# Patient Record
Sex: MALE | Race: ASIAN | NOT HISPANIC OR LATINO | URBAN - METROPOLITAN AREA
[De-identification: names, ages, dates, MRNs, and addresses within clinical notes are randomized per-mention and may not be internally consistent; named-entity substitution may affect disease eponyms.]

---

## 2016-12-16 RX ORDER — ALBUTEROL 90 UG/1
0 AEROSOL, METERED ORAL
Qty: 18 | Refills: 0 | COMMUNITY
Start: 2016-12-16

## 2016-12-16 RX ORDER — UMECLIDINIUM 62.5 UG/1
0 AEROSOL, POWDER ORAL
Qty: 30 | Refills: 0 | COMMUNITY
Start: 2016-12-16

## 2017-01-01 ENCOUNTER — INPATIENT (INPATIENT)
Facility: HOSPITAL | Age: 62
LOS: 3 days | Discharge: HOME | DRG: 386 | End: 2017-01-05
Attending: HOSPITALIST | Admitting: STUDENT IN AN ORGANIZED HEALTH CARE EDUCATION/TRAINING PROGRAM
Payer: MEDICAID

## 2017-01-01 VITALS
SYSTOLIC BLOOD PRESSURE: 189 MMHG | TEMPERATURE: 98 F | OXYGEN SATURATION: 97 % | RESPIRATION RATE: 20 BRPM | HEART RATE: 95 BPM | DIASTOLIC BLOOD PRESSURE: 75 MMHG

## 2017-01-01 DIAGNOSIS — Z87.19 PERSONAL HISTORY OF OTHER DISEASES OF THE DIGESTIVE SYSTEM: Chronic | ICD-10-CM

## 2017-01-01 DIAGNOSIS — Z98.890 OTHER SPECIFIED POSTPROCEDURAL STATES: Chronic | ICD-10-CM

## 2017-01-01 LAB
ALBUMIN SERPL ELPH-MCNC: 3.7 G/DL — SIGNIFICANT CHANGE UP (ref 3.3–5)
ALP SERPL-CCNC: 57 U/L — SIGNIFICANT CHANGE UP (ref 40–120)
ALT FLD-CCNC: 17 U/L RC — SIGNIFICANT CHANGE UP (ref 10–45)
ANION GAP SERPL CALC-SCNC: 15 MMOL/L — SIGNIFICANT CHANGE UP (ref 5–17)
APPEARANCE UR: ABNORMAL
APTT BLD: 28.6 SEC — SIGNIFICANT CHANGE UP (ref 27.5–37.4)
AST SERPL-CCNC: 18 U/L — SIGNIFICANT CHANGE UP (ref 10–40)
BASOPHILS # BLD AUTO: 0.1 K/UL — SIGNIFICANT CHANGE UP (ref 0–0.2)
BASOPHILS NFR BLD AUTO: 0.9 % — SIGNIFICANT CHANGE UP (ref 0–2)
BILIRUB SERPL-MCNC: 0.6 MG/DL — SIGNIFICANT CHANGE UP (ref 0.2–1.2)
BILIRUB UR-MCNC: NEGATIVE — SIGNIFICANT CHANGE UP
BUN SERPL-MCNC: 28 MG/DL — HIGH (ref 7–23)
CALCIUM SERPL-MCNC: 8.9 MG/DL — SIGNIFICANT CHANGE UP (ref 8.4–10.5)
CHLORIDE SERPL-SCNC: 105 MMOL/L — SIGNIFICANT CHANGE UP (ref 96–108)
CO2 SERPL-SCNC: 23 MMOL/L — SIGNIFICANT CHANGE UP (ref 22–31)
COD CRY URNS QL: ABNORMAL
COLOR SPEC: YELLOW — SIGNIFICANT CHANGE UP
COMMENT - URINE: SIGNIFICANT CHANGE UP
CREAT SERPL-MCNC: 1.02 MG/DL — SIGNIFICANT CHANGE UP (ref 0.5–1.3)
DIFF PNL FLD: NEGATIVE — SIGNIFICANT CHANGE UP
EOSINOPHIL # BLD AUTO: 0.4 K/UL — SIGNIFICANT CHANGE UP (ref 0–0.5)
EOSINOPHIL NFR BLD AUTO: 4.2 % — SIGNIFICANT CHANGE UP (ref 0–6)
EPI CELLS # UR: SIGNIFICANT CHANGE UP /HPF
GAS PNL BLDV: SIGNIFICANT CHANGE UP
GLUCOSE SERPL-MCNC: 169 MG/DL — HIGH (ref 70–99)
GLUCOSE UR QL: NEGATIVE — SIGNIFICANT CHANGE UP
HCT VFR BLD CALC: 35.5 % — LOW (ref 39–50)
HGB BLD-MCNC: 11.8 G/DL — LOW (ref 13–17)
HYALINE CASTS # UR AUTO: ABNORMAL
INR BLD: 1.09 RATIO — SIGNIFICANT CHANGE UP (ref 0.88–1.16)
KETONES UR-MCNC: NEGATIVE — SIGNIFICANT CHANGE UP
LEUKOCYTE ESTERASE UR-ACNC: ABNORMAL
LIDOCAIN IGE QN: 36 U/L — SIGNIFICANT CHANGE UP (ref 7–60)
LYMPHOCYTES # BLD AUTO: 2.4 K/UL — SIGNIFICANT CHANGE UP (ref 1–3.3)
LYMPHOCYTES # BLD AUTO: 26.1 % — SIGNIFICANT CHANGE UP (ref 13–44)
MCHC RBC-ENTMCNC: 32.6 PG — SIGNIFICANT CHANGE UP (ref 27–34)
MCHC RBC-ENTMCNC: 33.4 GM/DL — SIGNIFICANT CHANGE UP (ref 32–36)
MCV RBC AUTO: 97.7 FL — SIGNIFICANT CHANGE UP (ref 80–100)
MONOCYTES # BLD AUTO: 0.4 K/UL — SIGNIFICANT CHANGE UP (ref 0–0.9)
MONOCYTES NFR BLD AUTO: 4.7 % — SIGNIFICANT CHANGE UP (ref 2–14)
NEUTROPHILS # BLD AUTO: 6 K/UL — SIGNIFICANT CHANGE UP (ref 1.8–7.4)
NEUTROPHILS NFR BLD AUTO: 64.1 % — SIGNIFICANT CHANGE UP (ref 43–77)
NITRITE UR-MCNC: NEGATIVE — SIGNIFICANT CHANGE UP
PH UR: 5.5 — SIGNIFICANT CHANGE UP (ref 4.8–8)
PLATELET # BLD AUTO: 167 K/UL — SIGNIFICANT CHANGE UP (ref 150–400)
POTASSIUM SERPL-MCNC: 3.7 MMOL/L — SIGNIFICANT CHANGE UP (ref 3.5–5.3)
POTASSIUM SERPL-SCNC: 3.7 MMOL/L — SIGNIFICANT CHANGE UP (ref 3.5–5.3)
PROT SERPL-MCNC: 7 G/DL — SIGNIFICANT CHANGE UP (ref 6–8.3)
PROT UR-MCNC: SIGNIFICANT CHANGE UP
PROTHROM AB SERPL-ACNC: 11.8 SEC — SIGNIFICANT CHANGE UP (ref 10–13.1)
RBC # BLD: 3.63 M/UL — LOW (ref 4.2–5.8)
RBC # FLD: 12 % — SIGNIFICANT CHANGE UP (ref 10.3–14.5)
SODIUM SERPL-SCNC: 143 MMOL/L — SIGNIFICANT CHANGE UP (ref 135–145)
SP GR SPEC: >1.03 — HIGH (ref 1.01–1.02)
UROBILINOGEN FLD QL: NEGATIVE — SIGNIFICANT CHANGE UP
WBC # BLD: 9.3 K/UL — SIGNIFICANT CHANGE UP (ref 3.8–10.5)
WBC # FLD AUTO: 9.3 K/UL — SIGNIFICANT CHANGE UP (ref 3.8–10.5)
WBC UR QL: SIGNIFICANT CHANGE UP /HPF (ref 0–5)

## 2017-01-01 PROCEDURE — 74174 CTA ABD&PLVS W/CONTRAST: CPT | Mod: 26

## 2017-01-01 PROCEDURE — 99285 EMERGENCY DEPT VISIT HI MDM: CPT

## 2017-01-01 PROCEDURE — 71010: CPT | Mod: 26

## 2017-01-01 RX ORDER — SODIUM CHLORIDE 9 MG/ML
1000 INJECTION INTRAMUSCULAR; INTRAVENOUS; SUBCUTANEOUS ONCE
Qty: 0 | Refills: 0 | Status: COMPLETED | OUTPATIENT
Start: 2017-01-01 | End: 2017-01-01

## 2017-01-01 RX ADMIN — SODIUM CHLORIDE 2000 MILLILITER(S): 9 INJECTION INTRAMUSCULAR; INTRAVENOUS; SUBCUTANEOUS at 20:53

## 2017-01-01 RX ADMIN — SODIUM CHLORIDE 2000 MILLILITER(S): 9 INJECTION INTRAMUSCULAR; INTRAVENOUS; SUBCUTANEOUS at 21:00

## 2017-01-01 NOTE — ED PROVIDER NOTE - OBJECTIVE STATEMENT
60 y/o male with PMH of UC on Mesalamine, recent ascending aortic aneurysm open heart repair in April 2016 on ASA presents with blood in stool. Patient is Mandarin speaking and is requesting daughter to translate. States that since 6 pm yesterday until now, he has had 20 episodes of diarrhea with bright red blood per rectum. States with his UC he occasionally has scant blood in stool, but never like this. Currently denies any complaints other than fatigue. Denies HA, vision changes, Cp, SOB, N/V, numbness, paresthesias. Last colonoscopy in 2015 was WNL.  PMD: Dr. Kyaw De La Cruz 60 y/o male with PMH of UC on Mesalamine, recent ascending aortic aneurysm open heart repair in April 2016 on ASA presents with blood in stool. Patient is Mandarin speaking and is requesting daughter to translate. States that since 6 pm yesterday until now, he has had 20 episodes of diarrhea with bright red blood per rectum. States with his UC he occasionally has scant blood in stool, but never like this. Currently denies any complaints other than fatigue. Denies fever, HA, vision changes, Cp, SOB, N/V, numbness, paresthesias. Last colonoscopy in 2015 was WNL.  PMD: Dr. Kyaw De La Cruz 60 y/o male with PMH of Marfans, UC on Mesalamine, recent ascending aortic aneurysm open heart repair in April 2016 on ASA presents with blood in stool. Patient is Mandarin speaking and is requesting daughter to translate. States that since 6 pm yesterday until now, he has had 20 episodes of diarrhea with bright red blood per rectum. States with his UC he occasionally has scant blood in stool, but never like this. Currently denies any complaints other than fatigue. Denies fever, HA, vision changes, Cp, SOB, N/V, numbness, paresthesias. Last colonoscopy in 2015 was WNL.  PMD: Dr. Kyaw De La Cruz 62 y/o male Mandarin speaking male with PMH of Marfans, UC on Mesalamine, recent ascending aortic aneurysm open heart repair in April 2016 on ASA presents with blood in stool. Patient is Mandarin speaking and is requesting daughter to translate. States that since 6 pm yesterday until now, he has had 20 episodes of diarrhea with bright red blood per rectum. States with his UC he occasionally has scant blood in stool, but never like this. Currently denies any complaints other than fatigue. Denies fever, HA, vision changes, Cp, SOB, N/V, numbness, paresthesias. Last colonoscopy in 2015 was WNL.  PMD: Dr. Kyaw De La Cruz

## 2017-01-01 NOTE — ED PROVIDER NOTE - MEDICAL DECISION MAKING DETAILS
Darlene Resident: 60 y/o male with PMH of UC on rectal mesalamine and ASA presents with 20 episodes of bloody diarrhea since 6 pm yesterday - currently reports fatigue - afebrile - will check labs, hydrate, and admit for large GI bleed - BP currently stable and patient not tachy Darlene Resident: 62 y/o male with PMH of UC on rectal mesalamine and ASA presents with 20 episodes of bloody diarrhea since 6 pm yesterday - currently reports fatigue - afebrile - will check labs, hydrate, and admit for large GI bleed - BP currently stable and patient not tachy - will perform CTA a/p to r/o  aortic enteric fistula

## 2017-01-01 NOTE — ED PROVIDER NOTE - ATTENDING CONTRIBUTION TO CARE
I have examined and evaluated this patient with the above resident or PA, and agree with the documented clinical history, exam and plan.   Briefly: 62 yo M, with h/o Marfan's syndrome, AAA s/p repair, and UC, c/o 20+ episodes of bright red blood per rectum since yesterday; on exam, is well appearing, with no abdominal tenderness.  Concern for UC flair, enteritis/colitis/diverticulitis or diverticular bleed vs aorto-enteric fistula.  Labs obtained; notable for Hgb 11.8 (no clear baseline), and CT-angio obtained to assess for possible rapid bleed given report of 20+ bloody bowel movements.  No clear source of bleeding on CT to suggest rapid diverticular bleed or significant Aorto-enteric fistula.  Patient remained stable in ED, so plan to admit for further workup.

## 2017-01-01 NOTE — ED ADULT NURSE NOTE - OBJECTIVE STATEMENT
Pt c/o bloody stool since yesterday at 1800.  Pt states that he began having dark red blood with diarrhea every 2-3 hours, today he states he is having bloody diarrhea every 20 minutes, however, pt has not had bowel movement since arrival to exam room.  Pt with hx of UC x 20 years but states it is under control with meds and he rarely has bloody stool.  Also with hx of ascending aortic aneurysm with open heart repair 04/2016.  Currently only complains of fatigue, ambulatory, conversive, abd soft/nt/nd, skin cool and dry, intact, denies lightheadedness/dizziness, cp, palpitations, nvd, abd pain, f/c, numbness/tinlging, ha.

## 2017-01-02 DIAGNOSIS — R19.7 DIARRHEA, UNSPECIFIED: ICD-10-CM

## 2017-01-02 DIAGNOSIS — I10 ESSENTIAL (PRIMARY) HYPERTENSION: ICD-10-CM

## 2017-01-02 DIAGNOSIS — K62.5 HEMORRHAGE OF ANUS AND RECTUM: ICD-10-CM

## 2017-01-02 DIAGNOSIS — J43.9 EMPHYSEMA, UNSPECIFIED: ICD-10-CM

## 2017-01-02 DIAGNOSIS — Z53.09 PROCEDURE AND TREATMENT NOT CARRIED OUT BECAUSE OF OTHER CONTRAINDICATION: ICD-10-CM

## 2017-01-02 DIAGNOSIS — N40.0 BENIGN PROSTATIC HYPERPLASIA WITHOUT LOWER URINARY TRACT SYMPTOMS: ICD-10-CM

## 2017-01-02 DIAGNOSIS — K51.918 ULCERATIVE COLITIS, UNSPECIFIED WITH OTHER COMPLICATION: ICD-10-CM

## 2017-01-02 DIAGNOSIS — Z98.890 OTHER SPECIFIED POSTPROCEDURAL STATES: Chronic | ICD-10-CM

## 2017-01-02 LAB
ANION GAP SERPL CALC-SCNC: 10 MMOL/L — SIGNIFICANT CHANGE UP (ref 5–17)
BASOPHILS # BLD AUTO: 0.06 K/UL — SIGNIFICANT CHANGE UP (ref 0–0.2)
BASOPHILS NFR BLD AUTO: 0.9 % — SIGNIFICANT CHANGE UP (ref 0–2)
BUN SERPL-MCNC: 20 MG/DL — SIGNIFICANT CHANGE UP (ref 7–23)
CALCIUM SERPL-MCNC: 7.8 MG/DL — LOW (ref 8.4–10.5)
CHLORIDE SERPL-SCNC: 108 MMOL/L — SIGNIFICANT CHANGE UP (ref 96–108)
CHOLEST SERPL-MCNC: 118 MG/DL — SIGNIFICANT CHANGE UP (ref 10–199)
CO2 SERPL-SCNC: 21 MMOL/L — LOW (ref 22–31)
CREAT SERPL-MCNC: 0.63 MG/DL — SIGNIFICANT CHANGE UP (ref 0.5–1.3)
EOSINOPHIL # BLD AUTO: 0.39 K/UL — SIGNIFICANT CHANGE UP (ref 0–0.5)
EOSINOPHIL NFR BLD AUTO: 5.7 % — SIGNIFICANT CHANGE UP (ref 0–6)
GLUCOSE SERPL-MCNC: 96 MG/DL — SIGNIFICANT CHANGE UP (ref 70–99)
HCT VFR BLD CALC: 25.1 % — LOW (ref 39–50)
HCT VFR BLD CALC: 26.8 % — LOW (ref 39–50)
HDLC SERPL-MCNC: 33 MG/DL — LOW (ref 40–125)
HGB BLD-MCNC: 8.5 G/DL — LOW (ref 13–17)
HGB BLD-MCNC: 8.9 G/DL — LOW (ref 13–17)
IMM GRANULOCYTES NFR BLD AUTO: 0.1 % — SIGNIFICANT CHANGE UP (ref 0–1.5)
INR BLD: 1.15 RATIO — SIGNIFICANT CHANGE UP (ref 0.88–1.16)
LACTATE SERPL-SCNC: 0.8 MMOL/L — SIGNIFICANT CHANGE UP (ref 0.7–2)
LACTATE SERPL-SCNC: 2.3 MMOL/L — HIGH (ref 0.7–2)
LIPID PNL WITH DIRECT LDL SERPL: 66 MG/DL — SIGNIFICANT CHANGE UP
LYMPHOCYTES # BLD AUTO: 1.64 K/UL — SIGNIFICANT CHANGE UP (ref 1–3.3)
LYMPHOCYTES # BLD AUTO: 24.2 % — SIGNIFICANT CHANGE UP (ref 13–44)
MAGNESIUM SERPL-MCNC: 1.7 MG/DL — SIGNIFICANT CHANGE UP (ref 1.6–2.6)
MCHC RBC-ENTMCNC: 31.3 PG — SIGNIFICANT CHANGE UP (ref 27–34)
MCHC RBC-ENTMCNC: 31.4 PG — SIGNIFICANT CHANGE UP (ref 27–34)
MCHC RBC-ENTMCNC: 33.2 GM/DL — SIGNIFICANT CHANGE UP (ref 32–36)
MCHC RBC-ENTMCNC: 33.9 GM/DL — SIGNIFICANT CHANGE UP (ref 32–36)
MCV RBC AUTO: 92.6 FL — SIGNIFICANT CHANGE UP (ref 80–100)
MCV RBC AUTO: 94.4 FL — SIGNIFICANT CHANGE UP (ref 80–100)
MONOCYTES # BLD AUTO: 0.6 K/UL — SIGNIFICANT CHANGE UP (ref 0–0.9)
MONOCYTES NFR BLD AUTO: 8.8 % — SIGNIFICANT CHANGE UP (ref 2–14)
NEUTROPHILS # BLD AUTO: 4.09 K/UL — SIGNIFICANT CHANGE UP (ref 1.8–7.4)
NEUTROPHILS NFR BLD AUTO: 60.3 % — SIGNIFICANT CHANGE UP (ref 43–77)
PHOSPHATE SERPL-MCNC: 2.5 MG/DL — SIGNIFICANT CHANGE UP (ref 2.5–4.5)
PLATELET # BLD AUTO: 135 K/UL — LOW (ref 150–400)
PLATELET # BLD AUTO: 143 K/UL — LOW (ref 150–400)
POTASSIUM SERPL-MCNC: 3.7 MMOL/L — SIGNIFICANT CHANGE UP (ref 3.5–5.3)
POTASSIUM SERPL-SCNC: 3.7 MMOL/L — SIGNIFICANT CHANGE UP (ref 3.5–5.3)
PROTHROM AB SERPL-ACNC: 13 SEC — SIGNIFICANT CHANGE UP (ref 10–13.1)
RBC # BLD: 2.71 M/UL — LOW (ref 4.2–5.8)
RBC # BLD: 2.84 M/UL — LOW (ref 4.2–5.8)
RBC # FLD: 12.7 % — SIGNIFICANT CHANGE UP (ref 10.3–14.5)
RBC # FLD: 12.9 % — SIGNIFICANT CHANGE UP (ref 10.3–14.5)
SODIUM SERPL-SCNC: 139 MMOL/L — SIGNIFICANT CHANGE UP (ref 135–145)
TOTAL CHOLESTEROL/HDL RATIO MEASUREMENT: 3.6 RATIO — SIGNIFICANT CHANGE UP (ref 3.4–9.6)
TRIGL SERPL-MCNC: 96 MG/DL — SIGNIFICANT CHANGE UP (ref 10–149)
WBC # BLD: 6.79 K/UL — SIGNIFICANT CHANGE UP (ref 3.8–10.5)
WBC # BLD: 9.54 K/UL — SIGNIFICANT CHANGE UP (ref 3.8–10.5)
WBC # FLD AUTO: 6.79 K/UL — SIGNIFICANT CHANGE UP (ref 3.8–10.5)
WBC # FLD AUTO: 9.54 K/UL — SIGNIFICANT CHANGE UP (ref 3.8–10.5)

## 2017-01-02 PROCEDURE — 99223 1ST HOSP IP/OBS HIGH 75: CPT

## 2017-01-02 PROCEDURE — 99223 1ST HOSP IP/OBS HIGH 75: CPT | Mod: AI,GC

## 2017-01-02 RX ORDER — INFLUENZA VIRUS VACCINE 15; 15; 15; 15 UG/.5ML; UG/.5ML; UG/.5ML; UG/.5ML
0.5 SUSPENSION INTRAMUSCULAR ONCE
Qty: 0 | Refills: 0 | Status: DISCONTINUED | OUTPATIENT
Start: 2017-01-02 | End: 2017-01-05

## 2017-01-02 RX ORDER — ENOXAPARIN SODIUM 100 MG/ML
40 INJECTION SUBCUTANEOUS DAILY
Qty: 0 | Refills: 0 | Status: DISCONTINUED | OUTPATIENT
Start: 2017-01-02 | End: 2017-01-05

## 2017-01-02 RX ORDER — MESALAMINE 400 MG
4 TABLET, DELAYED RELEASE (ENTERIC COATED) ORAL AT BEDTIME
Qty: 0 | Refills: 0 | Status: DISCONTINUED | OUTPATIENT
Start: 2017-01-02 | End: 2017-01-05

## 2017-01-02 RX ORDER — METRONIDAZOLE 500 MG
500 TABLET ORAL ONCE
Qty: 0 | Refills: 0 | Status: COMPLETED | OUTPATIENT
Start: 2017-01-02 | End: 2017-01-02

## 2017-01-02 RX ORDER — CIPROFLOXACIN LACTATE 400MG/40ML
400 VIAL (ML) INTRAVENOUS EVERY 12 HOURS
Qty: 0 | Refills: 0 | Status: DISCONTINUED | OUTPATIENT
Start: 2017-01-02 | End: 2017-01-05

## 2017-01-02 RX ORDER — METRONIDAZOLE 500 MG
500 TABLET ORAL EVERY 8 HOURS
Qty: 0 | Refills: 0 | Status: DISCONTINUED | OUTPATIENT
Start: 2017-01-02 | End: 2017-01-05

## 2017-01-02 RX ORDER — CIPROFLOXACIN LACTATE 400MG/40ML
400 VIAL (ML) INTRAVENOUS ONCE
Qty: 0 | Refills: 0 | Status: COMPLETED | OUTPATIENT
Start: 2017-01-02 | End: 2017-01-02

## 2017-01-02 RX ORDER — PANTOPRAZOLE SODIUM 20 MG/1
8 TABLET, DELAYED RELEASE ORAL
Qty: 80 | Refills: 0 | Status: DISCONTINUED | OUTPATIENT
Start: 2017-01-02 | End: 2017-01-02

## 2017-01-02 RX ORDER — TAMSULOSIN HYDROCHLORIDE 0.4 MG/1
0.4 CAPSULE ORAL AT BEDTIME
Qty: 0 | Refills: 0 | Status: DISCONTINUED | OUTPATIENT
Start: 2017-01-02 | End: 2017-01-05

## 2017-01-02 RX ORDER — MESALAMINE 400 MG
4 TABLET, DELAYED RELEASE (ENTERIC COATED) ORAL ONCE
Qty: 0 | Refills: 0 | Status: COMPLETED | OUTPATIENT
Start: 2017-01-02 | End: 2017-01-02

## 2017-01-02 RX ORDER — SODIUM CHLORIDE 9 MG/ML
1000 INJECTION INTRAMUSCULAR; INTRAVENOUS; SUBCUTANEOUS
Qty: 0 | Refills: 0 | Status: DISCONTINUED | OUTPATIENT
Start: 2017-01-02 | End: 2017-01-04

## 2017-01-02 RX ORDER — ALBUTEROL 90 UG/1
2 AEROSOL, METERED ORAL EVERY 6 HOURS
Qty: 0 | Refills: 0 | Status: DISCONTINUED | OUTPATIENT
Start: 2017-01-02 | End: 2017-01-05

## 2017-01-02 RX ADMIN — Medication 200 MILLIGRAM(S): at 00:43

## 2017-01-02 RX ADMIN — SODIUM CHLORIDE 100 MILLILITER(S): 9 INJECTION INTRAMUSCULAR; INTRAVENOUS; SUBCUTANEOUS at 04:32

## 2017-01-02 RX ADMIN — Medication 200 MILLIGRAM(S): at 15:20

## 2017-01-02 RX ADMIN — SODIUM CHLORIDE 100 MILLILITER(S): 9 INJECTION INTRAMUSCULAR; INTRAVENOUS; SUBCUTANEOUS at 15:22

## 2017-01-02 RX ADMIN — Medication 100 MILLIGRAM(S): at 02:21

## 2017-01-02 RX ADMIN — Medication 4 GRAM(S): at 22:39

## 2017-01-02 RX ADMIN — PANTOPRAZOLE SODIUM 10 MG/HR: 20 TABLET, DELAYED RELEASE ORAL at 01:03

## 2017-01-02 RX ADMIN — TAMSULOSIN HYDROCHLORIDE 0.4 MILLIGRAM(S): 0.4 CAPSULE ORAL at 21:27

## 2017-01-02 RX ADMIN — Medication 100 MILLIGRAM(S): at 09:50

## 2017-01-02 RX ADMIN — Medication 100 MILLIGRAM(S): at 18:22

## 2017-01-02 NOTE — H&P ADULT. - PROBLEM SELECTOR PLAN 3
- No recent use of antibiotics, but sending for C diff rule out.  - Patient does not have leukocytosis.   - Diarrhea 2/2 flare vs ichemic colitis.  Lactate is elevated.

## 2017-01-02 NOTE — H&P ADULT. - ASSESSMENT
60 yo M with marfan's, ascending aortic aneurysm s/p open heart surgery, Ulcerative colitis, and emphysema, who presented with BRBPR likely 2/2 UC flare, vs. infectious colitis.

## 2017-01-02 NOTE — PROVIDER CONTACT NOTE (OTHER) - ASSESSMENT
PT is AO4. R/O CDiff. Denies any pain at this time. LBM today 1/2/2017. Vitals are as documented. Safety maintained.

## 2017-01-02 NOTE — H&P ADULT. - PROBLEM SELECTOR PLAN 4
- Holding PTA medications for now.   - Patient was on Metoprolol for A fib history. No longer on Warfarin since July.

## 2017-01-02 NOTE — H&P ADULT. - LYMPHATIC
posterior cervical R/posterior cervical L/supraclavicular L/supraclavicular R/anterior cervical R/anterior cervical L

## 2017-01-02 NOTE — H&P ADULT. - PMH
Ascending aortic aneurysm    BPH (benign prostatic hyperplasia)    HTN (hypertension)    Marfan syndrome    Spinal stenosis of lumbar region    Ulcerative colitis

## 2017-01-02 NOTE — H&P ADULT. - PROBLEM SELECTOR PLAN 1
- Patient with BRBPR, but rectal exam did not show evidence of bleed, hemorrhoids or fissure. Pt's BUN is elevated.  Could be from dehydration vs. GI bleed (usually upper).  Known UC, and bleeding is likely from that.  Had colonscopy 2015 which was normal. May be ischemic colitis vs. Ulcerative flare vs. infectious.  Patient's Lactate was 3.    - Trend Lactate   - Will consult GI  - Check CBC in AM and trend  - Started Protonix  - NPO except ice chips - Patient with BRBPR, but rectal exam did not show evidence of bleed, hemorrhoids or fissure. Pt's BUN is elevated.  Could be from dehydration vs. GI bleed (usually upper).  Known UC, and bleeding is likely from that.  Had colonscopy 2015 which was normal. May be ischemic colitis vs. Ulcerative flare vs. infectious.  Patient's Lactate was 3.    - Trend Lactate   - Will consult GI  - Check CBC in AM and trend  - Started Protonix  - NPO except ice chips for now.  - Advance diet as tolerated.

## 2017-01-02 NOTE — H&P ADULT. - HISTORY OF PRESENT ILLNESS
60 yo M with pmhx of aortic ascending aneurysm s/p repair April 2016, Marfan's Syndrome, Ulcerative Colitis on Mesalamine, Emphysema, Spinal Stenosis, Known Lung nodule, being followed, and Former smoker, presenting with Diarrhea and bright red blood per rectum since 6 pm Yesterday (1/1/2016).  Patient was having diarrhea every 1-2 hours, than started to be closer together, every half hour.  No other complaints. No abdominal pain, no N/V, no dysuria.  No fevers or chills at home.    Patient is mandarin speaking only.  Majority of history was obtained by daughter.   Currently patient endorses no pain, no discomfort, no cough or SOB.  Patient does not feel he needs to use the restroom.  Has not had a BM since being in the ED.      In the ED, patient was afebrile, normotensive, on RA saturating well.  Patient received 2L NS bolus, cipro/flagyl, PPI infusion.

## 2017-01-02 NOTE — H&P ADULT. - RS GEN PE MLT RESP DETAILS PC
normal/breath sounds equal/clear to auscultation bilaterally/airway patent/respirations non-labored/good air movement

## 2017-01-02 NOTE — H&P ADULT. - PROBLEM SELECTOR PLAN 2
- Patient may have ulcerative colitis flare with BRBPR.   - Will consult GI  - Started Cipro 400 mg Q12H and Flagyl 500 mg Q8H IV  - Holding PTA Mesalamine due to diarrhea - Patient may have ulcerative colitis flare with BRBPR.   - Ca Oxalate crystals present in the Urine likely 2/2 UC as well due to increase oxalate absorption in GI tract and decreased loss of alkali.   - Will consult GI  - Started Cipro 400 mg Q12H and Flagyl 500 mg Q8H IV  - Holding PTA Mesalamine due to diarrhea  - Start Prednisolone 30 mg IV Q12H.

## 2017-01-02 NOTE — H&P ADULT. - ATTENDING COMMENTS
60 yo M with pmhx of aortic ascending aneurysm s/p repair April 2016, Marfan's Syndrome, Ulcerative Colitis p/w lower GI bleed absence of abdominal pain, no symptoms of anemia, labs with anemia unknown baseline hgb ,  CTA w/o extravasation of contrast there is diverticulosis. will monitor h/h, and obtain GI consult in daytime.

## 2017-01-02 NOTE — H&P ADULT. - RADIOLOGY RESULTS AND INTERPRETATION
Personally reviewed by me.  No acute GI bleed.  Hypodense splenic lesion. Clear Lungs CXR Personally reviewed by me, no focal ifiltrate, CTA report reviewed   No acute GI bleed.  Hypodense splenic lesion. Clear Lungs

## 2017-01-02 NOTE — H&P ADULT. - LAB RESULTS AND INTERPRETATION
Personally reviewed by me.  Patient has anemia of 11.  Dehydrated. high specific gravity.  Ca Oxalate stones likely 2/2 inflammatory bowel disease.  Elevated lactate.  Increased BUN.

## 2017-01-03 LAB
ANION GAP SERPL CALC-SCNC: 14 MMOL/L — SIGNIFICANT CHANGE UP (ref 5–17)
BUN SERPL-MCNC: 12 MG/DL — SIGNIFICANT CHANGE UP (ref 7–23)
C DIFF BY PCR RESULT: SIGNIFICANT CHANGE UP
C DIFF TOX GENS STL QL NAA+PROBE: SIGNIFICANT CHANGE UP
CALCIUM SERPL-MCNC: 8.1 MG/DL — LOW (ref 8.4–10.5)
CHLORIDE SERPL-SCNC: 108 MMOL/L — SIGNIFICANT CHANGE UP (ref 96–108)
CO2 SERPL-SCNC: 21 MMOL/L — LOW (ref 22–31)
CREAT SERPL-MCNC: 0.8 MG/DL — SIGNIFICANT CHANGE UP (ref 0.5–1.3)
CRP SERPL-MCNC: 0.23 MG/DL — SIGNIFICANT CHANGE UP (ref 0–0.4)
ERYTHROCYTE [SEDIMENTATION RATE] IN BLOOD: 2 MM/HR — SIGNIFICANT CHANGE UP (ref 0–20)
GLUCOSE SERPL-MCNC: 139 MG/DL — HIGH (ref 70–99)
HCT VFR BLD CALC: 22.3 % — LOW (ref 39–50)
HCT VFR BLD CALC: 23.9 % — LOW (ref 39–50)
HCT VFR BLD CALC: 24.6 % — LOW (ref 39–50)
HGB BLD-MCNC: 7.8 G/DL — LOW (ref 13–17)
HGB BLD-MCNC: 8.1 G/DL — LOW (ref 13–17)
HGB BLD-MCNC: 8.3 G/DL — LOW (ref 13–17)
MAGNESIUM SERPL-MCNC: 1.9 MG/DL — SIGNIFICANT CHANGE UP (ref 1.6–2.6)
MCHC RBC-ENTMCNC: 31.2 PG — SIGNIFICANT CHANGE UP (ref 27–34)
MCHC RBC-ENTMCNC: 32.9 GM/DL — SIGNIFICANT CHANGE UP (ref 32–36)
MCHC RBC-ENTMCNC: 33.9 PG — SIGNIFICANT CHANGE UP (ref 27–34)
MCHC RBC-ENTMCNC: 34.1 PG — HIGH (ref 27–34)
MCHC RBC-ENTMCNC: 34.9 GM/DL — SIGNIFICANT CHANGE UP (ref 32–36)
MCHC RBC-ENTMCNC: 34.9 GM/DL — SIGNIFICANT CHANGE UP (ref 32–36)
MCV RBC AUTO: 94.6 FL — SIGNIFICANT CHANGE UP (ref 80–100)
MCV RBC AUTO: 97 FL — SIGNIFICANT CHANGE UP (ref 80–100)
MCV RBC AUTO: 97.8 FL — SIGNIFICANT CHANGE UP (ref 80–100)
PLATELET # BLD AUTO: 124 K/UL — LOW (ref 150–400)
PLATELET # BLD AUTO: 127 K/UL — LOW (ref 150–400)
PLATELET # BLD AUTO: 64 K/UL — LOW (ref 150–400)
POTASSIUM SERPL-MCNC: 4.1 MMOL/L — SIGNIFICANT CHANGE UP (ref 3.5–5.3)
POTASSIUM SERPL-SCNC: 4.1 MMOL/L — SIGNIFICANT CHANGE UP (ref 3.5–5.3)
RBC # BLD: 2.3 M/UL — LOW (ref 4.2–5.8)
RBC # BLD: 2.44 M/UL — LOW (ref 4.2–5.8)
RBC # BLD: 2.6 M/UL — LOW (ref 4.2–5.8)
RBC # FLD: 11.9 % — SIGNIFICANT CHANGE UP (ref 10.3–14.5)
RBC # FLD: 12 % — SIGNIFICANT CHANGE UP (ref 10.3–14.5)
RBC # FLD: 13 % — SIGNIFICANT CHANGE UP (ref 10.3–14.5)
SODIUM SERPL-SCNC: 143 MMOL/L — SIGNIFICANT CHANGE UP (ref 135–145)
WBC # BLD: 6.2 K/UL — SIGNIFICANT CHANGE UP (ref 3.8–10.5)
WBC # BLD: 7.28 K/UL — SIGNIFICANT CHANGE UP (ref 3.8–10.5)
WBC # BLD: 8.4 K/UL — SIGNIFICANT CHANGE UP (ref 3.8–10.5)
WBC # FLD AUTO: 6.2 K/UL — SIGNIFICANT CHANGE UP (ref 3.8–10.5)
WBC # FLD AUTO: 7.28 K/UL — SIGNIFICANT CHANGE UP (ref 3.8–10.5)
WBC # FLD AUTO: 8.4 K/UL — SIGNIFICANT CHANGE UP (ref 3.8–10.5)

## 2017-01-03 PROCEDURE — 99232 SBSQ HOSP IP/OBS MODERATE 35: CPT | Mod: GC

## 2017-01-03 PROCEDURE — 99233 SBSQ HOSP IP/OBS HIGH 50: CPT | Mod: GC

## 2017-01-03 RX ORDER — ONDANSETRON 8 MG/1
4 TABLET, FILM COATED ORAL EVERY 6 HOURS
Qty: 0 | Refills: 0 | Status: DISCONTINUED | OUTPATIENT
Start: 2017-01-03 | End: 2017-01-05

## 2017-01-03 RX ADMIN — SODIUM CHLORIDE 100 MILLILITER(S): 9 INJECTION INTRAMUSCULAR; INTRAVENOUS; SUBCUTANEOUS at 08:19

## 2017-01-03 RX ADMIN — Medication 100 MILLIGRAM(S): at 11:54

## 2017-01-03 RX ADMIN — Medication 200 MILLIGRAM(S): at 04:04

## 2017-01-03 RX ADMIN — Medication 4 GRAM(S): at 21:26

## 2017-01-03 RX ADMIN — Medication 100 MILLIGRAM(S): at 21:17

## 2017-01-03 RX ADMIN — TAMSULOSIN HYDROCHLORIDE 0.4 MILLIGRAM(S): 0.4 CAPSULE ORAL at 21:17

## 2017-01-03 RX ADMIN — Medication 100 MILLIGRAM(S): at 03:01

## 2017-01-03 RX ADMIN — Medication 200 MILLIGRAM(S): at 15:59

## 2017-01-04 LAB
ANION GAP SERPL CALC-SCNC: 7 MMOL/L — SIGNIFICANT CHANGE UP (ref 5–17)
BLD GP AB SCN SERPL QL: NEGATIVE — SIGNIFICANT CHANGE UP
BUN SERPL-MCNC: 5 MG/DL — LOW (ref 7–23)
CALCIUM SERPL-MCNC: 8 MG/DL — LOW (ref 8.4–10.5)
CHLORIDE SERPL-SCNC: 108 MMOL/L — SIGNIFICANT CHANGE UP (ref 96–108)
CO2 SERPL-SCNC: 25 MMOL/L — SIGNIFICANT CHANGE UP (ref 22–31)
CREAT SERPL-MCNC: 0.73 MG/DL — SIGNIFICANT CHANGE UP (ref 0.5–1.3)
CULTURE RESULTS: SIGNIFICANT CHANGE UP
GLUCOSE SERPL-MCNC: 114 MG/DL — HIGH (ref 70–99)
HCT VFR BLD CALC: 23.8 % — LOW (ref 39–50)
HCT VFR BLD CALC: 25.6 % — LOW (ref 39–50)
HGB BLD-MCNC: 7.9 G/DL — LOW (ref 13–17)
HGB BLD-MCNC: 9.1 G/DL — LOW (ref 13–17)
LDH SERPL L TO P-CCNC: 181 U/L — SIGNIFICANT CHANGE UP (ref 50–242)
MCHC RBC-ENTMCNC: 31.2 PG — SIGNIFICANT CHANGE UP (ref 27–34)
MCHC RBC-ENTMCNC: 33.2 GM/DL — SIGNIFICANT CHANGE UP (ref 32–36)
MCHC RBC-ENTMCNC: 34.1 PG — HIGH (ref 27–34)
MCHC RBC-ENTMCNC: 35.5 GM/DL — SIGNIFICANT CHANGE UP (ref 32–36)
MCV RBC AUTO: 94.1 FL — SIGNIFICANT CHANGE UP (ref 80–100)
MCV RBC AUTO: 96.1 FL — SIGNIFICANT CHANGE UP (ref 80–100)
PLATELET # BLD AUTO: 146 K/UL — LOW (ref 150–400)
PLATELET # BLD AUTO: 146 K/UL — LOW (ref 150–400)
POTASSIUM SERPL-MCNC: 3.8 MMOL/L — SIGNIFICANT CHANGE UP (ref 3.5–5.3)
POTASSIUM SERPL-SCNC: 3.8 MMOL/L — SIGNIFICANT CHANGE UP (ref 3.5–5.3)
RBC # BLD: 2.53 M/UL — LOW (ref 4.2–5.8)
RBC # BLD: 2.66 M/UL — LOW (ref 4.2–5.8)
RBC # FLD: 11.4 % — SIGNIFICANT CHANGE UP (ref 10.3–14.5)
RBC # FLD: 13 % — SIGNIFICANT CHANGE UP (ref 10.3–14.5)
RH IG SCN BLD-IMP: POSITIVE — SIGNIFICANT CHANGE UP
SODIUM SERPL-SCNC: 140 MMOL/L — SIGNIFICANT CHANGE UP (ref 135–145)
SPECIMEN SOURCE: SIGNIFICANT CHANGE UP
WBC # BLD: 8 K/UL — SIGNIFICANT CHANGE UP (ref 3.8–10.5)
WBC # BLD: 8.68 K/UL — SIGNIFICANT CHANGE UP (ref 3.8–10.5)
WBC # FLD AUTO: 8 K/UL — SIGNIFICANT CHANGE UP (ref 3.8–10.5)
WBC # FLD AUTO: 8.68 K/UL — SIGNIFICANT CHANGE UP (ref 3.8–10.5)

## 2017-01-04 PROCEDURE — 99233 SBSQ HOSP IP/OBS HIGH 50: CPT | Mod: GC

## 2017-01-04 PROCEDURE — 99231 SBSQ HOSP IP/OBS SF/LOW 25: CPT | Mod: GC

## 2017-01-04 RX ADMIN — TAMSULOSIN HYDROCHLORIDE 0.4 MILLIGRAM(S): 0.4 CAPSULE ORAL at 21:06

## 2017-01-04 RX ADMIN — ONDANSETRON 4 MILLIGRAM(S): 8 TABLET, FILM COATED ORAL at 15:01

## 2017-01-04 RX ADMIN — Medication 100 MILLIGRAM(S): at 05:14

## 2017-01-04 RX ADMIN — Medication 200 MILLIGRAM(S): at 05:23

## 2017-01-04 RX ADMIN — Medication 100 MILLIGRAM(S): at 11:07

## 2017-01-04 RX ADMIN — Medication 100 MILLIGRAM(S): at 21:23

## 2017-01-04 RX ADMIN — SODIUM CHLORIDE 100 MILLILITER(S): 9 INJECTION INTRAMUSCULAR; INTRAVENOUS; SUBCUTANEOUS at 11:07

## 2017-01-04 RX ADMIN — ENOXAPARIN SODIUM 40 MILLIGRAM(S): 100 INJECTION SUBCUTANEOUS at 11:08

## 2017-01-04 RX ADMIN — Medication 200 MILLIGRAM(S): at 17:02

## 2017-01-04 RX ADMIN — Medication 4 GRAM(S): at 22:35

## 2017-01-04 NOTE — DISCHARGE NOTE ADULT - PATIENT PORTAL LINK FT
“You can access the [facility] Patient Portal, offered by [facility], by registering with the following website: [email or web address]”

## 2017-01-04 NOTE — DISCHARGE NOTE ADULT - MEDICATION SUMMARY - MEDICATIONS TO TAKE
I will START or STAY ON the medications listed below when I get home from the hospital:    mesalamine 4 g/60 mL rectal enema  -- 60 milliliter(s) rectally once a day (at bedtime)  -- Indication: For Ulcerative colitis    aspirin 81 mg oral tablet, chewable  -- 1 tab(s) by mouth once a day  -- Indication: For Health care maintainence    Flomax 0.4 mg oral capsule  -- 1 cap(s) by mouth once a day  -- Indication: For BPH (benign prostatic hyperplasia)    alendronate 70 mg oral tablet  -- 1 tab(s) by mouth once a week  -- Indication: For osteoporosis    INCRUSE ELPT INH 62.5MCG  -- Indication: For Dyspnea    VENTOLIN HFA AER  -- Indication: For Dyspnea    FeroSul 325 mg (65 mg elemental iron) oral tablet  -- 1 tab(s) by mouth once a day  -- Check with your doctor before becoming pregnant.  Do not chew, break, or crush.  May discolor urine or feces.    -- Indication: For nutrition    Cipro 500 mg oral tablet  -- 1 tab(s) by mouth 2 times a day  -- Avoid prolonged or excessive exposure to direct and/or artificial sunlight while taking this medication.  Check with your doctor before becoming pregnant.  Do not take dairy products, antacids, or iron preparations within one hour of this medication.  Finish all this medication unless otherwise directed by prescriber.  Medication should be taken with plenty of water.    -- Indication: For Ulcerative colitis

## 2017-01-04 NOTE — DISCHARGE NOTE ADULT - HOSPITAL COURSE
62 yo M with pmhx of aortic ascending aneurysm s/p repair April 2016, Marfan's Syndrome, Ulcerative Colitis on Mesalamine, Emphysema, Spinal Stenosis, Known Lung nodule, being followed, and Former smoker, presenting with Diarrhea and bright red blood per rectum since 6 pm Yesterday (1/1/2016).  Patient was having diarrhea every 1-2 hours, than started to be closer together, every half hour.  No other complaints. No abdominal pain, no N/V, no dysuria.  No fevers or chills at home.    Patient is mandarin speaking only.  Majority of history was obtained by daughter.   Currently patient endorses no pain, no discomfort, no cough or SOB.  Patient does not feel he needs to use the restroom.  Has not had a BM since being in the ED.      In the ED, patient was afebrile, normotensive, on RA saturating well.  Patient received 2L NS bolus, cipro/flagyl, PPI infusion. 62 yo M with pmhx of aortic ascending aneurysm s/p repair April 2016, Marfan's Syndrome, Ulcerative Colitis on Mesalamine, Emphysema, Spinal Stenosis, Known Lung nodule, being followed, and Former smoker, presenting with Diarrhea and bright red blood per rectum since 6 pm Yesterday (1/1/2016).  Patient was having diarrhea every 1-2 hours, than started to be closer together, every half hour.  No other complaints. No abdominal pain, no N/V, no dysuria.  No fevers or chills at home.    Patient is mandarin speaking only.  Majority of history was obtained by daughter.   Currently patient endorses no pain, no discomfort, no cough or SOB.  Patient does not feel he needs to use the restroom.  Has not had a BM since being in the ED.      In the ED, patient was afebrile, normotensive, on RA saturating well.  Patient received 2L NS bolus, cipro/flagyl, PPI infusion.      Patient admitted to medicine. GI consulted and patient was restarted on home mesalamine. C. Diff, stool culture, ova and parasite were negative. Hg initially decreased from 11.8 to 8.9 and has since stabilized near hg of 9. No blood transfusion were required. CT a/p noted no evidence of active gastrointestinal hemorrhage and an Indeterminate hypodense splenic lesion. CXR noted clear lung. UA was negative. Patient has been afebrile with mild leukocytosis. Patient is clinically improved with nausea and diarrhea improved. Per GI, no endoscopy is indicated. Patient was initially placed on clear liquid diet and has been advanced to regular diet and has tolerated it well. Patient is hemodynamically and medically stable to discharge home with follow up with PMD and GI within one week. 60 yo M with pmhx of aortic ascending aneurysm s/p repair April 2016, Marfan's Syndrome, Ulcerative Colitis on Mesalamine, Emphysema, Spinal Stenosis, Known Lung nodule, being followed, and Former smoker, presenting with Diarrhea and bright red blood per rectum since 6 pm Yesterday (1/1/2016).  Patient was having diarrhea every 1-2 hours, than started to be closer together, every half hour.  No other complaints. No abdominal pain, no N/V, no dysuria.  No fevers or chills at home.    Patient is mandarin speaking only.  Majority of history was obtained by daughter.   Currently patient endorses no pain, no discomfort, no cough or SOB.  Patient does not feel he needs to use the restroom.  Has not had a BM since being in the ED.      In the ED, patient was afebrile, normotensive, on RA saturating well.  Patient received 2L NS bolus, cipro/flagyl, PPI infusion.      Patient admitted to medicine. GI consulted and patient was restarted on home mesalamine. C. Diff, stool culture, ova and parasite were negative. Hg initially decreased from 11.8 to 8.9 and has since stabilized near hg of 9. No blood transfusion were required. CT a/p noted no evidence of active gastrointestinal hemorrhage and an Indeterminate hypodense splenic lesion. CXR noted clear lung. UA was negative. Patient has been afebrile with mild leukocytosis. Patient is clinically improved with nausea and diarrhea improved. Per GI, no endoscopy is indicated. Patient was initially placed on clear liquid diet and has been advanced to regular diet and has tolerated it well. Patient is hemodynamically and medically stable to discharge home with follow up with PMD and GI within one week.

## 2017-01-04 NOTE — DISCHARGE NOTE ADULT - CARE PLAN
Principal Discharge DX:	Hematochezia  Goal:	resolution  Instructions for follow-up, activity and diet:	You were admitted for hematochezia that was likely due to ulcerative colitis flare. Please continue to take mesalamine and ferrous sulfate and ciprofloxacin as directed and follow up with gastroenterologist and PMD within one week.  Secondary Diagnosis:	Ulcerative colitis  Goal:	remission  Instructions for follow-up, activity and diet:	Please continue to take mesalamine as directed and follow up with gastroenterologist and PMD within one week.  Secondary Diagnosis:	Splenic lesion  Goal:	further workup  Instructions for follow-up, activity and diet:	CT A/P noted an indeterminate hypodense splenic lesion. Please follow up with your PMD within one week for further workup.

## 2017-01-04 NOTE — DISCHARGE NOTE ADULT - CARE PROVIDER_API CALL
sal moody  Gastroenterology  Phone: (   )    -  Fax: (   )    - Rashaun LOVELL    [phone]  Phone: (   )    -  Fax: (   )    -    Jono Card  [facility]  Phone: (   )    -  Fax: (   )    -

## 2017-01-04 NOTE — DISCHARGE NOTE ADULT - PLAN OF CARE
remission Please continue to take mesalamine as directed and follow up with gastroenterologist and PMD within one week. resolution You were admitted for hematochezia that was likely due to ulcerative colitis flare. Please continue to take mesalamine and ferrous sulfate and ciprofloxacin as directed and follow up with gastroenterologist and PMD within one week. further workup CT A/P noted an indeterminate hypodense splenic lesion. Please follow up with your PMD within one week for further workup.

## 2017-01-04 NOTE — DISCHARGE NOTE ADULT - PROVIDER TOKENS
FREE:[LAST:[fransisco],FIRST:[sal],PHONE:[(   )    -],FAX:[(   )    -],ADDRESS:[Gastroenterology]] FREE:[LAST:[LILIYA],FIRST:[Rashaun],PHONE:[(   )    -],FAX:[(   )    -],ADDRESS:[  [phone]]],FREE:[LAST:[Kyaw],FIRST:[Jono],PHONE:[(   )    -],FAX:[(   )    -],ADDRESS:[[facility]]]

## 2017-01-04 NOTE — DISCHARGE NOTE ADULT - MEDICATION SUMMARY - MEDICATIONS TO STOP TAKING
I will STOP taking the medications listed below when I get home from the hospital:    Toprol-XL 25 mg oral tablet, extended release  -- 1 tab(s) by mouth once a day

## 2017-01-05 VITALS
OXYGEN SATURATION: 97 % | HEART RATE: 64 BPM | TEMPERATURE: 98 F | SYSTOLIC BLOOD PRESSURE: 99 MMHG | DIASTOLIC BLOOD PRESSURE: 63 MMHG

## 2017-01-05 LAB
ANION GAP SERPL CALC-SCNC: 9 MMOL/L — SIGNIFICANT CHANGE UP (ref 5–17)
BUN SERPL-MCNC: 7 MG/DL — SIGNIFICANT CHANGE UP (ref 7–23)
CALCIUM SERPL-MCNC: 8.2 MG/DL — LOW (ref 8.4–10.5)
CHLORIDE SERPL-SCNC: 105 MMOL/L — SIGNIFICANT CHANGE UP (ref 96–108)
CO2 SERPL-SCNC: 26 MMOL/L — SIGNIFICANT CHANGE UP (ref 22–31)
CREAT SERPL-MCNC: 0.73 MG/DL — SIGNIFICANT CHANGE UP (ref 0.5–1.3)
CULTURE RESULTS: SIGNIFICANT CHANGE UP
GLUCOSE SERPL-MCNC: 109 MG/DL — HIGH (ref 70–99)
HCT VFR BLD CALC: 23.9 % — LOW (ref 39–50)
HGB BLD-MCNC: 8 G/DL — LOW (ref 13–17)
MCHC RBC-ENTMCNC: 31.6 PG — SIGNIFICANT CHANGE UP (ref 27–34)
MCHC RBC-ENTMCNC: 33.5 GM/DL — SIGNIFICANT CHANGE UP (ref 32–36)
MCV RBC AUTO: 94.5 FL — SIGNIFICANT CHANGE UP (ref 80–100)
PLATELET # BLD AUTO: 165 K/UL — SIGNIFICANT CHANGE UP (ref 150–400)
POTASSIUM SERPL-MCNC: 3.3 MMOL/L — LOW (ref 3.5–5.3)
POTASSIUM SERPL-SCNC: 3.3 MMOL/L — LOW (ref 3.5–5.3)
RBC # BLD: 2.53 M/UL — LOW (ref 4.2–5.8)
RBC # FLD: 12.9 % — SIGNIFICANT CHANGE UP (ref 10.3–14.5)
SODIUM SERPL-SCNC: 140 MMOL/L — SIGNIFICANT CHANGE UP (ref 135–145)
SPECIMEN SOURCE: SIGNIFICANT CHANGE UP
WBC # BLD: 11.06 K/UL — HIGH (ref 3.8–10.5)
WBC # FLD AUTO: 11.06 K/UL — HIGH (ref 3.8–10.5)

## 2017-01-05 PROCEDURE — 80061 LIPID PANEL: CPT

## 2017-01-05 PROCEDURE — 87046 STOOL CULTR AEROBIC BACT EA: CPT

## 2017-01-05 PROCEDURE — 85610 PROTHROMBIN TIME: CPT

## 2017-01-05 PROCEDURE — 71045 X-RAY EXAM CHEST 1 VIEW: CPT

## 2017-01-05 PROCEDURE — 87493 C DIFF AMPLIFIED PROBE: CPT

## 2017-01-05 PROCEDURE — 99239 HOSP IP/OBS DSCHRG MGMT >30: CPT

## 2017-01-05 PROCEDURE — 81001 URINALYSIS AUTO W/SCOPE: CPT

## 2017-01-05 PROCEDURE — 74174 CTA ABD&PLVS W/CONTRAST: CPT

## 2017-01-05 PROCEDURE — 83993 ASSAY FOR CALPROTECTIN FECAL: CPT

## 2017-01-05 PROCEDURE — 86140 C-REACTIVE PROTEIN: CPT

## 2017-01-05 PROCEDURE — 96365 THER/PROPH/DIAG IV INF INIT: CPT | Mod: XU

## 2017-01-05 PROCEDURE — 82435 ASSAY OF BLOOD CHLORIDE: CPT

## 2017-01-05 PROCEDURE — 86850 RBC ANTIBODY SCREEN: CPT

## 2017-01-05 PROCEDURE — 86901 BLOOD TYPING SEROLOGIC RH(D): CPT

## 2017-01-05 PROCEDURE — 85027 COMPLETE CBC AUTOMATED: CPT

## 2017-01-05 PROCEDURE — 84100 ASSAY OF PHOSPHORUS: CPT

## 2017-01-05 PROCEDURE — 82330 ASSAY OF CALCIUM: CPT

## 2017-01-05 PROCEDURE — 80048 BASIC METABOLIC PNL TOTAL CA: CPT

## 2017-01-05 PROCEDURE — 83615 LACTATE (LD) (LDH) ENZYME: CPT

## 2017-01-05 PROCEDURE — 83605 ASSAY OF LACTIC ACID: CPT

## 2017-01-05 PROCEDURE — 82803 BLOOD GASES ANY COMBINATION: CPT

## 2017-01-05 PROCEDURE — 84295 ASSAY OF SERUM SODIUM: CPT

## 2017-01-05 PROCEDURE — 87045 FECES CULTURE AEROBIC BACT: CPT

## 2017-01-05 PROCEDURE — 85014 HEMATOCRIT: CPT

## 2017-01-05 PROCEDURE — 86900 BLOOD TYPING SEROLOGIC ABO: CPT

## 2017-01-05 PROCEDURE — 85730 THROMBOPLASTIN TIME PARTIAL: CPT

## 2017-01-05 PROCEDURE — 83690 ASSAY OF LIPASE: CPT

## 2017-01-05 PROCEDURE — 93005 ELECTROCARDIOGRAM TRACING: CPT

## 2017-01-05 PROCEDURE — 83735 ASSAY OF MAGNESIUM: CPT

## 2017-01-05 PROCEDURE — 82947 ASSAY GLUCOSE BLOOD QUANT: CPT

## 2017-01-05 PROCEDURE — 84132 ASSAY OF SERUM POTASSIUM: CPT

## 2017-01-05 PROCEDURE — 85652 RBC SED RATE AUTOMATED: CPT

## 2017-01-05 PROCEDURE — 80053 COMPREHEN METABOLIC PANEL: CPT

## 2017-01-05 PROCEDURE — 87177 OVA AND PARASITES SMEARS: CPT

## 2017-01-05 PROCEDURE — 99285 EMERGENCY DEPT VISIT HI MDM: CPT | Mod: 25

## 2017-01-05 RX ORDER — POTASSIUM CHLORIDE 20 MEQ
40 PACKET (EA) ORAL ONCE
Qty: 0 | Refills: 0 | Status: DISCONTINUED | OUTPATIENT
Start: 2017-01-05 | End: 2017-01-05

## 2017-01-05 RX ORDER — MOXIFLOXACIN HYDROCHLORIDE TABLETS, 400 MG 400 MG/1
1 TABLET, FILM COATED ORAL
Qty: 4 | Refills: 0 | OUTPATIENT
Start: 2017-01-05 | End: 2017-01-07

## 2017-01-05 RX ORDER — FERROUS SULFATE 325(65) MG
1 TABLET ORAL
Qty: 30 | Refills: 0 | OUTPATIENT
Start: 2017-01-05 | End: 2017-02-04

## 2017-01-05 RX ADMIN — Medication 200 MILLIGRAM(S): at 06:21

## 2017-01-05 RX ADMIN — Medication 100 MILLIGRAM(S): at 04:56

## 2017-01-05 NOTE — PROVIDER CONTACT NOTE (MEDICATION) - SITUATION
Pt scheduled to receive Flagyl IVPB. Pt was admitted to r/o C diff. C Diff came back negative earlier today. Should pt still receive Flagyl.

## 2017-01-05 NOTE — PROVIDER CONTACT NOTE (MEDICATION) - ACTION/TREATMENT ORDERED:
As per MD, continue with IV Flagyl as MD did not receive report on discontinuing abx.  MD will pass onto dy team to reevaluate abx regimen

## 2017-01-06 LAB — CALPROTECTIN STL-MCNT: 530 UG/G — HIGH (ref 0–120)

## 2017-01-18 ENCOUNTER — MESSAGE (OUTPATIENT)
Age: 62
End: 2017-01-18

## 2017-02-13 ENCOUNTER — APPOINTMENT (OUTPATIENT)
Dept: CARDIOLOGY | Facility: CLINIC | Age: 62
End: 2017-02-13

## 2017-02-13 ENCOUNTER — NON-APPOINTMENT (OUTPATIENT)
Age: 62
End: 2017-02-13

## 2017-02-13 VITALS
BODY MASS INDEX: 21.2 KG/M2 | OXYGEN SATURATION: 98 % | RESPIRATION RATE: 18 BRPM | TEMPERATURE: 97.6 F | DIASTOLIC BLOOD PRESSURE: 82 MMHG | HEART RATE: 70 BPM | SYSTOLIC BLOOD PRESSURE: 135 MMHG | WEIGHT: 152 LBS

## 2017-05-17 ENCOUNTER — APPOINTMENT (OUTPATIENT)
Dept: CARDIOLOGY | Facility: CLINIC | Age: 62
End: 2017-05-17

## 2017-05-17 VITALS
BODY MASS INDEX: 21.62 KG/M2 | TEMPERATURE: 97.9 F | OXYGEN SATURATION: 95 % | DIASTOLIC BLOOD PRESSURE: 82 MMHG | WEIGHT: 155 LBS | SYSTOLIC BLOOD PRESSURE: 148 MMHG | RESPIRATION RATE: 17 BRPM | HEART RATE: 66 BPM

## 2017-05-17 DIAGNOSIS — I48.0 PAROXYSMAL ATRIAL FIBRILLATION: ICD-10-CM

## 2017-11-03 RX ORDER — ASPIRIN/CALCIUM CARB/MAGNESIUM 324 MG
1 TABLET ORAL
Qty: 0 | Refills: 0 | COMMUNITY

## 2017-11-03 RX ORDER — METOPROLOL TARTRATE 50 MG
1 TABLET ORAL
Qty: 0 | Refills: 0 | COMMUNITY

## 2017-11-03 RX ORDER — ALENDRONATE SODIUM 70 MG/1
1 TABLET ORAL
Qty: 0 | Refills: 0 | COMMUNITY

## 2017-11-03 RX ORDER — TAMSULOSIN HYDROCHLORIDE 0.4 MG/1
1 CAPSULE ORAL
Qty: 0 | Refills: 0 | COMMUNITY

## 2017-11-03 RX ORDER — MESALAMINE 400 MG
60 TABLET, DELAYED RELEASE (ENTERIC COATED) ORAL
Qty: 0 | Refills: 0 | COMMUNITY

## 2017-11-08 ENCOUNTER — APPOINTMENT (OUTPATIENT)
Dept: CARDIOLOGY | Facility: CLINIC | Age: 62
End: 2017-11-08

## 2017-12-07 ENCOUNTER — APPOINTMENT (OUTPATIENT)
Dept: CARDIOLOGY | Facility: CLINIC | Age: 62
End: 2017-12-07
Payer: MEDICAID

## 2017-12-07 ENCOUNTER — NON-APPOINTMENT (OUTPATIENT)
Age: 62
End: 2017-12-07

## 2017-12-07 VITALS
WEIGHT: 162 LBS | RESPIRATION RATE: 17 BRPM | SYSTOLIC BLOOD PRESSURE: 123 MMHG | OXYGEN SATURATION: 97 % | BODY MASS INDEX: 22.59 KG/M2 | DIASTOLIC BLOOD PRESSURE: 69 MMHG | TEMPERATURE: 97.3 F | HEART RATE: 55 BPM

## 2017-12-07 PROBLEM — I10 ESSENTIAL (PRIMARY) HYPERTENSION: Chronic | Status: ACTIVE | Noted: 2017-01-01

## 2017-12-07 PROBLEM — K51.90 ULCERATIVE COLITIS, UNSPECIFIED, WITHOUT COMPLICATIONS: Chronic | Status: ACTIVE | Noted: 2017-01-01

## 2017-12-07 PROBLEM — I71.2 THORACIC AORTIC ANEURYSM, WITHOUT RUPTURE: Chronic | Status: ACTIVE | Noted: 2017-01-02

## 2017-12-07 PROBLEM — Q87.40 MARFAN SYNDROME, UNSPECIFIED: Chronic | Status: ACTIVE | Noted: 2017-01-02

## 2017-12-07 PROBLEM — N40.0 BENIGN PROSTATIC HYPERPLASIA WITHOUT LOWER URINARY TRACT SYMPTOMS: Chronic | Status: ACTIVE | Noted: 2017-01-01

## 2017-12-07 PROBLEM — M48.06 SPINAL STENOSIS, LUMBAR REGION: Chronic | Status: ACTIVE | Noted: 2017-01-02

## 2017-12-07 PROCEDURE — 93000 ELECTROCARDIOGRAM COMPLETE: CPT

## 2017-12-07 PROCEDURE — 99214 OFFICE O/P EST MOD 30 MIN: CPT

## 2017-12-21 ENCOUNTER — APPOINTMENT (OUTPATIENT)
Dept: CARDIOLOGY | Facility: CLINIC | Age: 62
End: 2017-12-21
Payer: MEDICAID

## 2017-12-21 VITALS
OXYGEN SATURATION: 96 % | BODY MASS INDEX: 22.18 KG/M2 | TEMPERATURE: 97.4 F | DIASTOLIC BLOOD PRESSURE: 64 MMHG | WEIGHT: 159 LBS | RESPIRATION RATE: 17 BRPM | HEART RATE: 56 BPM | SYSTOLIC BLOOD PRESSURE: 119 MMHG

## 2017-12-21 PROCEDURE — 99214 OFFICE O/P EST MOD 30 MIN: CPT

## 2017-12-27 ENCOUNTER — MESSAGE (OUTPATIENT)
Age: 62
End: 2017-12-27

## 2018-01-23 ENCOUNTER — APPOINTMENT (OUTPATIENT)
Dept: CARDIOLOGY | Facility: CLINIC | Age: 63
End: 2018-01-23
Payer: MEDICAID

## 2018-01-23 VITALS
SYSTOLIC BLOOD PRESSURE: 127 MMHG | BODY MASS INDEX: 21.9 KG/M2 | RESPIRATION RATE: 17 BRPM | HEART RATE: 56 BPM | TEMPERATURE: 98.2 F | WEIGHT: 157 LBS | OXYGEN SATURATION: 98 % | DIASTOLIC BLOOD PRESSURE: 67 MMHG

## 2018-01-23 DIAGNOSIS — R07.89 OTHER CHEST PAIN: ICD-10-CM

## 2018-01-23 PROCEDURE — 93000 ELECTROCARDIOGRAM COMPLETE: CPT

## 2018-01-23 PROCEDURE — 99214 OFFICE O/P EST MOD 30 MIN: CPT

## 2018-01-23 PROCEDURE — 93306 TTE W/DOPPLER COMPLETE: CPT

## 2018-03-06 ENCOUNTER — OTHER (OUTPATIENT)
Age: 63
End: 2018-03-06

## 2018-03-26 ENCOUNTER — APPOINTMENT (OUTPATIENT)
Dept: CARDIOLOGY | Facility: CLINIC | Age: 63
End: 2018-03-26
Payer: MEDICAID

## 2018-03-26 VITALS
WEIGHT: 160 LBS | SYSTOLIC BLOOD PRESSURE: 126 MMHG | HEART RATE: 53 BPM | DIASTOLIC BLOOD PRESSURE: 72 MMHG | OXYGEN SATURATION: 100 % | RESPIRATION RATE: 18 BRPM | BODY MASS INDEX: 22.32 KG/M2 | TEMPERATURE: 97.1 F

## 2018-03-26 PROCEDURE — 99214 OFFICE O/P EST MOD 30 MIN: CPT

## 2018-04-06 ENCOUNTER — MESSAGE (OUTPATIENT)
Age: 63
End: 2018-04-06

## 2018-04-20 ENCOUNTER — MEDICATION RENEWAL (OUTPATIENT)
Age: 63
End: 2018-04-20

## 2018-04-20 ENCOUNTER — MESSAGE (OUTPATIENT)
Age: 63
End: 2018-04-20

## 2018-04-20 RX ORDER — LABETALOL HYDROCHLORIDE 200 MG/1
200 TABLET, FILM COATED ORAL
Qty: 60 | Refills: 5 | Status: ACTIVE | COMMUNITY
Start: 2018-01-23 | End: 1900-01-01

## 2018-06-26 ENCOUNTER — APPOINTMENT (OUTPATIENT)
Dept: CARDIOLOGY | Facility: CLINIC | Age: 63
End: 2018-06-26
Payer: MEDICARE

## 2018-06-26 VITALS
DIASTOLIC BLOOD PRESSURE: 57 MMHG | WEIGHT: 158 LBS | RESPIRATION RATE: 18 BRPM | OXYGEN SATURATION: 96 % | SYSTOLIC BLOOD PRESSURE: 113 MMHG | HEART RATE: 59 BPM | BODY MASS INDEX: 22.04 KG/M2

## 2018-06-26 DIAGNOSIS — Z98.890 OTHER SPECIFIED POSTPROCEDURAL STATES: ICD-10-CM

## 2018-06-26 DIAGNOSIS — Z86.79 OTHER SPECIFIED POSTPROCEDURAL STATES: ICD-10-CM

## 2018-06-26 PROCEDURE — 99214 OFFICE O/P EST MOD 30 MIN: CPT

## 2018-06-26 RX ORDER — LABETALOL HYDROCHLORIDE 200 MG/1
200 TABLET, FILM COATED ORAL 3 TIMES DAILY
Qty: 180 | Refills: 5 | Status: ACTIVE | COMMUNITY
Start: 2017-11-14 | End: 1900-01-01

## 2018-08-01 ENCOUNTER — MEDICATION RENEWAL (OUTPATIENT)
Age: 63
End: 2018-08-01

## 2018-08-02 RX ORDER — AMLODIPINE BESYLATE 10 MG/1
10 TABLET ORAL
Qty: 90 | Refills: 1 | Status: ACTIVE | COMMUNITY
Start: 2017-11-10 | End: 1900-01-01

## 2018-09-26 ENCOUNTER — APPOINTMENT (OUTPATIENT)
Dept: CARDIOLOGY | Facility: CLINIC | Age: 63
End: 2018-09-26
Payer: MEDICARE

## 2018-09-26 VITALS
OXYGEN SATURATION: 97 % | RESPIRATION RATE: 17 BRPM | TEMPERATURE: 97.7 F | HEART RATE: 54 BPM | BODY MASS INDEX: 21.76 KG/M2 | SYSTOLIC BLOOD PRESSURE: 120 MMHG | DIASTOLIC BLOOD PRESSURE: 70 MMHG | WEIGHT: 156 LBS

## 2018-09-26 DIAGNOSIS — Q87.40 MARFAN'S SYNDROME, UNSPECIFIED: ICD-10-CM

## 2018-09-26 DIAGNOSIS — I71.00 DISSECTION OF UNSPECIFIED SITE OF AORTA: ICD-10-CM

## 2018-09-26 DIAGNOSIS — I71.9 AORTIC ANEURYSM OF UNSPECIFIED SITE, W/OUT RUPTURE: ICD-10-CM

## 2018-09-26 DIAGNOSIS — I10 ESSENTIAL (PRIMARY) HYPERTENSION: ICD-10-CM

## 2018-09-26 PROCEDURE — 99214 OFFICE O/P EST MOD 30 MIN: CPT

## 2018-09-28 PROBLEM — Q87.40 MARFAN SYNDROME: Status: ACTIVE | Noted: 2017-08-20

## 2018-10-23 RX ORDER — LOSARTAN POTASSIUM 25 MG/1
25 TABLET, FILM COATED ORAL DAILY
Qty: 30 | Refills: 2 | Status: ACTIVE | COMMUNITY
Start: 2018-10-22 | End: 1900-01-01

## 2018-11-01 ENCOUNTER — MESSAGE (OUTPATIENT)
Age: 63
End: 2018-11-01

## 2018-11-27 ENCOUNTER — MESSAGE (OUTPATIENT)
Age: 63
End: 2018-11-27

## 2018-12-27 ENCOUNTER — APPOINTMENT (OUTPATIENT)
Dept: CARDIOLOGY | Facility: CLINIC | Age: 63
End: 2018-12-27

## 2019-02-22 ENCOUNTER — APPOINTMENT (OUTPATIENT)
Dept: RADIOLOGY | Facility: HOSPITAL | Age: 64
End: 2019-02-22

## 2020-09-16 NOTE — DISCHARGE NOTE ADULT - FUNCTIONAL SCREEN CURRENT LEVEL: DRESSING, MLM
Nutrition Education    Consult received for diet education. Pt is not appropriate for diet education today, very lethargic. Diet education placed at bedside. Will follow per [facility]. · Written educational materials provided on Healthy Nutrition Therapy- Mediterranean diet   · Contact name and number provided. · Refer to Patient Education activity for more details.     Electronically signed by Mami Soriano RD, LD on 9/16/20 at 3:06 PM EDT    Contact: [phone] (2) assistive person

## 2023-07-15 NOTE — H&P ADULT. - SKIN/BREAST
Pt ambulated to restroom, mother at side, no difficulty or distress noted      Zaira Bullock RN  07/15/23 3046 details…